# Patient Record
Sex: FEMALE | Race: WHITE | NOT HISPANIC OR LATINO | Employment: UNEMPLOYED | URBAN - METROPOLITAN AREA
[De-identification: names, ages, dates, MRNs, and addresses within clinical notes are randomized per-mention and may not be internally consistent; named-entity substitution may affect disease eponyms.]

---

## 2019-03-18 ENCOUNTER — OFFICE VISIT CONVERTED (OUTPATIENT)
Dept: NEUROSURGERY | Facility: CLINIC | Age: 49
End: 2019-03-18
Attending: PHYSICIAN ASSISTANT

## 2019-04-04 ENCOUNTER — OFFICE VISIT CONVERTED (OUTPATIENT)
Dept: NEUROSURGERY | Facility: CLINIC | Age: 49
End: 2019-04-04
Attending: PHYSICIAN ASSISTANT

## 2020-08-04 ENCOUNTER — OFFICE VISIT CONVERTED (OUTPATIENT)
Dept: NEUROSURGERY | Facility: CLINIC | Age: 50
End: 2020-08-04
Attending: PHYSICIAN ASSISTANT

## 2021-05-13 NOTE — PROGRESS NOTES
Progress Note      Patient Name: Chelsie Washington   Patient ID: [id number]   Sex: Female   YOB: 1970    Primary Care Provider: Wanda MORENO   Referring Provider: Shannan MORENO    Visit Date: August 4, 2020    Provider: Concetta Menjivar PA-C   Location: [facility]   Location Address: [address] [place] [address]  [id number]   Location Phone: [phone]          Chief Complaint     Patient is here for a follow up after injections.       History Of Present Illness     Pt notes that she got one LESI and that it helped with her symptoms. Pt notes that the injection helped with both right leg pain and low back pain. She would like to try additional LESIs.  She notes that she did not have insurance for a period and that she did not get additional injections.       Past Medical History  Arthritis; Degenerative Disc Disease ; Hyperlipidemia; Hypertension, Benign Essential; Lumbago/low back pain; Thyroid disease         Past Surgical History  Carpal Tunnel Release; Cholecystecomy; Hysterectomy; Joint Surgery         Medication List  atorvastatin 40 mg oral tablet; furosemide 20 mg oral tablet; Klor-Con 20 mEq oral packet; losartan 100 mg oral tablet; meloxicam 15 mg oral tablet; metoprolol tartrate 25 mg oral tablet         Allergy List  diclofenac sodium; PENICILLINS         Family Medical History  Family history of Arthritis; Family history of stroke; Family history of heart disease; Family history of osteoporosis         Social History  Alcohol (Current some day); Homemaker; lives with spouse; ; Tobacco (Never)         Review of Systems  · Constitutional  o Denies  o : chills, excessive sweating, fatigue, fever, sycope/passing out, weight gain, weight loss  · Eyes  o Denies  o : changes in vision, blurry vision, double vision  · HENT  o Denies  o : loss of hearing, ringing in the ears, ear aches, sore throat, nasal congestion, sinus pain, nose bleeds, seasonal  "allergies  · Cardiovascular  o Denies  o : blood clots, swollen legs, anemia, easy burising or bleeding, transfusions  · Respiratory  o Denies  o : shortness of breath, dry cough, productive cough, pneumonia, COPD  · Gastrointestinal  o Denies  o : difficulty swallowing, reflux  · Genitourinary  o Denies  o : incontinence  · Neurologic  o Denies  o : headache, seizure, stroke, tremor, loss of balance, falls, dizziness/vertigo, difficulty with sleep, numbness/tingling/paresthesia , difficulty with coordination, difficulty with dexterity, weakness  · Musculoskeletal  o Admits  o : neck stiffness/pain, sciatica, low back pain  o Denies  o : swollen lymph nodes, muscle aches, joint pain, weakness, spasms, pain radiating in arm, pain radiating in leg  · Endocrine  o Denies  o : diabetes, thyroid disorder  · Psychiatric  o Denies  o : anxiety, depression      Vitals  Date Time BP Position Site L\R Cuff Size HR RR TEMP (F) WT  HT  BMI kg/m2 BSA m2 O2 Sat        08/04/2020 03:42 PM        97.3 222lbs 0oz 5'  4\" 38.11 2.13           Physical Examination  · Constitutional  o Appearance  o : well-nourished, well developed, alert, in no acute distress  · Respiratory  o Respiratory Effort  o : breathing unlabored  · Cardiovascular  o Peripheral Vascular System  o :   § Extremities  § : no edema or cyanosis  · Musculoskeletal  o Spine  o :   § Inspection/Palpation  § : tenderness to palpation present in right lower back  o Right Lower Extremity  o :   § Inspection/Palpation  § : no joint or limb tenderness to palpation, no edema present, no ecchymosis  § Joint Stability  § : joint stability within normal limits  § Range of Motion  § : range of motion normal, no joint crepitations present, no pain on motion, Marcos's test negative  o Left Lower Extremity  o :   § Inspection/Palpation  § : no joint or limb tenderness to palpation, no edema present, no ecchymosis  § Joint Stability  § : joint stability within normal " limits  § Range of Motion  § : range of motion normal, no joint crepitations present, no pain on motion, Marcos's test negative  · Skin and Subcutaneous Tissue  o Extremities  o :   § Right Lower Extremity  § : no lesions or areas of discoloration  § Left Lower Extremity  § : no lesions or areas of discoloration  o Back  o : no lesions or areas of discoloration  · Neurologic  o Mental Status Examination  o :   § Orientation  § : alert and oriented to time, person, place and events  o Motor Examination  o :   § RLE Strength  § : mild foot drop noted on right.   § RLE Motor Function  § : tone normal, no atrophy, no abnormal movements noted  § LLE Strength  § : strength normal  § LLE Motor Function  § : tone normal, no atrophy, no abnormal movements noted  o Reflexes  o :   § RLE  § : knee and ankle reflexes 2/4, SLR negative  § LLE  § : knee and ankle reflexes 2/4, SLR negative  o Sensation  o :   § Light Touch  § : sensation intact to light touch in extremities  o Gait and Station  o :   § Gait Screening  § : normal gait, able to stand without difficulty  · Psychiatric  o Mood and Affect  o : mood normal, affect appropriate          Assessment  · Lumbago/low back pain     724.3/M54.40  · Paresthesia     782.0/R20.2  · Sciatica     724.3/M54.30    Problems Reconciled  Plan  · Medications  o Medications have been Reconciled  o Transition of Care or Provider Policy  · Instructions  o Will send for additional LESIs. Return to us as needed.   o Electronically Identified Patient Education Materials Provided Electronically  · Disposition  o Call or Return if symptoms worsen or persist.            Electronically Signed by: Concetta Menjivar PA-C -Author on August 4, 2020 04:28:47 PM

## 2021-05-15 VITALS — BODY MASS INDEX: 37.56 KG/M2 | WEIGHT: 220 LBS | HEART RATE: 77 BPM | HEIGHT: 64 IN

## 2021-05-15 VITALS — WEIGHT: 223 LBS | HEART RATE: 78 BPM | BODY MASS INDEX: 38.07 KG/M2 | HEIGHT: 64 IN

## 2021-05-15 VITALS — HEIGHT: 64 IN | BODY MASS INDEX: 37.9 KG/M2 | WEIGHT: 222 LBS | TEMPERATURE: 97.3 F

## 2021-06-02 ENCOUNTER — OFFICE VISIT CONVERTED (OUTPATIENT)
Dept: NEUROLOGY | Facility: CLINIC | Age: 51
End: 2021-06-02
Attending: NURSE PRACTITIONER

## 2021-06-05 NOTE — PROGRESS NOTES
Progress Note      Patient Name: Chelsie Washington   Patient ID: [id number]   Sex: Female   YOB: 1970    Primary Care Provider: Wanda MORENO   Referring Provider: Shannan MORENO    Visit Date: June 2, 2021    Provider: JOSH Allen   Location: [facility]   Location Address: [address] [place] [address]  [id number]   Location Phone: [phone]          Chief Complaint     Patient following up with chronic low back pain       History Of Present Illness     She was receiving LESI, initially was beneficial. Unfortunately did not receive any benefit with her most recent injection. She has concerns of weakness in the right thigh with frequent falls. She is having difficulty raising the leg, difficulty rising from a seated position. Unable to bend or squat. She is c/o cramping into the anterior thigh. No changes with bowel and bladder.    Her last MRI in 2019 shows disc protrusions at L1/2 and L4/5. L4/5 shows right sided canal and foraminal narrowing.       Past Medical History  Arthritis; Degenerative Disc Disease ; Hyperlipidemia; Hypertension, Benign Essential; Lumbago/low back pain; Thyroid disease         Past Surgical History  Carpal Tunnel Release; Cholecystecomy; Hysterectomy; Joint Surgery         Medication List  atorvastatin 40 mg oral tablet; furosemide 20 mg oral tablet; levothyroxine 112 mcg oral capsule; losartan 100 mg oral tablet; metformin 500 mg oral tablet; metoprolol tartrate 25 mg oral tablet; potassium citrate 15 mEq oral tablet extended release; vitamin B12-folic acid 500-400 mcg oral tablet         Allergy List  diclofenac sodium; PENICILLINS         Family Medical History  Family history of Arthritis; Family history of stroke; Family history of heart disease; Family history of osteoporosis         Social History  Alcohol (Current some day); Homemaker; lives with spouse; ; Tobacco (Never)         Review of  "Systems  · Constitutional  o Denies  o : chills, excessive sweating, fatigue, fever, sycope/passing out, weight gain, weight loss  · Eyes  o Denies  o : changes in vision, blurry vision, double vision  · HENT  o Denies  o : loss of hearing, ringing in the ears, ear aches, sore throat, nasal congestion, sinus pain, nose bleeds, seasonal allergies  · Cardiovascular  o Denies  o : blood clots, swollen legs, anemia, easy burising or bleeding, transfusions  · Respiratory  o Denies  o : shortness of breath, dry cough, productive cough, pneumonia, COPD  · Gastrointestinal  o Denies  o : difficulty swallowing, reflux  · Genitourinary  o Denies  o : incontinence  · Neurologic  o Denies  o : headache, seizure, stroke, tremor, loss of balance, falls, dizziness/vertigo, difficulty with sleep, numbness/tingling/paresthesia , difficulty with coordination, difficulty with dexterity, weakness  · Musculoskeletal  o Admits  o : muscle aches, weakness, spasms, pain radiating in leg, low back pain  o Denies  o : neck stiffness/pain, swollen lymph nodes, joint pain, sciatica, pain radiating in arm  · Endocrine  o Denies  o : diabetes, thyroid disorder  · Psychiatric  o Denies  o : anxiety, depression  · All Others Negative      Vitals  Date Time BP Position Site L\R Cuff Size HR RR TEMP (F) WT  HT  BMI kg/m2 BSA m2 O2 Sat FR L/min FiO2        06/02/2021 11:27 /81 Sitting    71 - R 99 97.2 225lbs 9oz 5'  3\" 39.96 2.13 99 %            Physical Examination  · Constitutional  o Appearance  o : well-nourished, well developed, alert, in no acute distress  · Respiratory  o Respiratory Effort  o : breathing unlabored  · Cardiovascular  o Peripheral Vascular System  o :   § Extremities  § : no edema or cyanosis  · Musculoskeletal  o Spine  o :   § Inspection/Palpation  § : tenderness to palpation present in right lower back  o Right Lower Extremity  o :   § Inspection/Palpation  § : TTP in the SI joint  § Joint Stability  § : joint " stability within normal limits  § Range of Motion  § : range of motion normal, no joint crepitations present, no pain on motion, Marcos's test negative  o Left Lower Extremity  o :   § Inspection/Palpation  § : no joint or limb tenderness to palpation, no edema present, no ecchymosis  § Joint Stability  § : joint stability within normal limits  § Range of Motion  § : range of motion normal, no joint crepitations present, no pain on motion, Marcos's test negative  · Skin and Subcutaneous Tissue  o Extremities  o :   § Right Lower Extremity  § : no lesions or areas of discoloration  § Left Lower Extremity  § : no lesions or areas of discoloration  o Back  o : no lesions or areas of discoloration  · Neurologic  o Mental Status Examination  o :   § Orientation  § : alert and oriented to time, person, place and events  o Motor Examination  o :   § RLE Strength  § : diffuse weakness in the leg  § RLE Motor Function  § : tone normal, no atrophy, no abnormal movements noted  § LLE Strength  § : strength normal  § LLE Motor Function  § : tone normal, no atrophy, no abnormal movements noted  o Reflexes  o :   § RLE  § : knee and ankle reflexes 2/4, back pain with SLR  § LLE  § : knee and ankle reflexes 2/4, SLR negative  o Sensation  o :   § Light Touch  § : reduced sensation in L5/S1 dermatome on the right  o Gait and Station  o :   § Gait Screening  § : difficulty with standing, antalgic broad based gait  · Psychiatric  o Mood and Affect  o : mood normal, affect appropriate              Assessment  · Displacement of lumbar intervertebral disc     722.10/M51.26  · Lumbago/low back pain     724.3/M54.40  · Paresthesia     782.0/R20.2  · Radiculopathy, lumbosacral     724.4/M54.16  · Sciatica     724.3/M54.30       Pt with chronic low back pain with disc protrusions at L1/2 and L4/5. She is no longer responding to LESI. Interested in surgical options. Will proceed with MRI Lumbar Spine and follow up to discuss surgery.        Plan  · Orders  o MRI lumbar spine w/o contrast (71501) - 782.0/R20.2, 722.10/M51.26, 724.4/M54.16 - 2021   Scheduled [place]   · Medications  o tizanidine 2 mg oral tablet   SI-2 tablets by mouth every 8 hours as need for muscle spasms   DISP: (60) Tablet with 1 refills  Prescribed on 2021     · Instructions  o MRI Lumbar Spine.  o Tizanidine 2-4 mg as needed.   o Follow up in 6 weeks.            Electronically Signed by: JSOH Allen -Author on 2021 12:06:33 PM

## 2021-06-19 ENCOUNTER — TRANSCRIBE ORDERS (OUTPATIENT)
Dept: ADMINISTRATIVE | Facility: HOSPITAL | Age: 51
End: 2021-06-19

## 2021-06-19 DIAGNOSIS — M54.17 RADICULOPATHY, LUMBOSACRAL REGION: ICD-10-CM

## 2021-06-19 DIAGNOSIS — M51.26 DISPLACEMENT OF LUMBAR INTERVERTEBRAL DISC: ICD-10-CM

## 2021-06-19 DIAGNOSIS — R20.2 PARESTHESIA: Primary | ICD-10-CM

## 2021-06-19 DIAGNOSIS — M51.26 RUPTURED LUMBAR INTERVERTEBRAL DISC: ICD-10-CM

## 2021-06-21 ENCOUNTER — TELEPHONE (OUTPATIENT)
Dept: NEUROSURGERY | Facility: CLINIC | Age: 51
End: 2021-06-21

## 2021-06-21 NOTE — TELEPHONE ENCOUNTER
Patient had originally been scheduled for an MRI at an outside facility and due to insurance she would like for the MRI referral to be for [facility]

## 2021-07-15 VITALS
BODY MASS INDEX: 39.96 KG/M2 | HEART RATE: 71 BPM | HEIGHT: 63 IN | DIASTOLIC BLOOD PRESSURE: 81 MMHG | SYSTOLIC BLOOD PRESSURE: 150 MMHG | OXYGEN SATURATION: 99 % | WEIGHT: 225.56 LBS | TEMPERATURE: 97.2 F

## 2021-07-28 ENCOUNTER — HOSPITAL ENCOUNTER (OUTPATIENT)
Dept: MRI IMAGING | Facility: HOSPITAL | Age: 51
Discharge: HOME | End: 2021-07-28
Admitting: NURSE PRACTITIONER

## 2021-07-28 DIAGNOSIS — R20.2 PARESTHESIA: ICD-10-CM

## 2021-07-28 DIAGNOSIS — M51.26 DISPLACEMENT OF LUMBAR INTERVERTEBRAL DISC: ICD-10-CM

## 2021-07-28 DIAGNOSIS — M54.17 RADICULOPATHY, LUMBOSACRAL REGION: ICD-10-CM

## 2021-07-28 PROCEDURE — 72148 MRI LUMBAR SPINE W/O DYE: CPT

## 2021-08-03 ENCOUNTER — OFFICE VISIT (OUTPATIENT)
Dept: NEUROSURGERY | Facility: CLINIC | Age: 51
End: 2021-08-03

## 2021-08-03 VITALS
HEART RATE: 63 BPM | HEIGHT: 63 IN | SYSTOLIC BLOOD PRESSURE: 159 MMHG | OXYGEN SATURATION: 98 % | DIASTOLIC BLOOD PRESSURE: 98 MMHG | BODY MASS INDEX: 39.9 KG/M2 | WEIGHT: 225.2 LBS

## 2021-08-03 DIAGNOSIS — M41.20 SCOLIOSIS (AND KYPHOSCOLIOSIS), IDIOPATHIC: ICD-10-CM

## 2021-08-03 DIAGNOSIS — M46.1 SI (SACROILIAC) JOINT INFLAMMATION (HCC): Primary | ICD-10-CM

## 2021-08-03 DIAGNOSIS — M47.817 SPONDYLOSIS OF LUMBOSACRAL REGION WITHOUT MYELOPATHY OR RADICULOPATHY: ICD-10-CM

## 2021-08-03 PROCEDURE — 99214 OFFICE O/P EST MOD 30 MIN: CPT | Performed by: NURSE PRACTITIONER

## 2021-08-03 RX ORDER — POTASSIUM CITRATE 15 MEQ/1
TABLET, EXTENDED RELEASE ORAL
COMMUNITY
Start: 2021-04-29

## 2021-08-03 RX ORDER — LOSARTAN POTASSIUM 100 MG/1
TABLET ORAL
COMMUNITY

## 2021-08-03 RX ORDER — FUROSEMIDE 20 MG/1
TABLET ORAL
COMMUNITY

## 2021-08-03 RX ORDER — TIZANIDINE 4 MG/1
4 TABLET ORAL NIGHTLY PRN
COMMUNITY

## 2021-08-03 RX ORDER — ATORVASTATIN CALCIUM 40 MG/1
TABLET, FILM COATED ORAL
COMMUNITY

## 2021-08-03 RX ORDER — LEVOTHYROXINE SODIUM 112 UG/1
CAPSULE ORAL
COMMUNITY

## 2021-08-03 NOTE — PROGRESS NOTES
"Chief Complaint  Back Pain and Follow-up (Lumbar MRI @ [facility])    Subjective          Chelsie Washington who is a 50 y.o. year old female who presents to [facility] her for follow up of her low back pain following MRI Lumbar Spine.    MRI lumbar spine shows dextrocurvature with multilevel degenerative changes with DDD, causing multilevel disc bulges and mild to moderate canal narrowing at several levels. No high grade canal or foraminal stenosis.     Her pain is primarily in the right lower back, radiating into the posterior thigh and stopping at the knee. She is also having similar pain on the left though not as significant. She has TTP at the SI joints bilaterally. Some TTP at trochanteric bursa though not severe.       Interval History 6/2/21  She was receiving LESI, initially was beneficial. Unfortunately did not receive any benefit with her most recent injection. She has concerns of weakness in the right thigh with frequent falls. She is having difficulty raising the leg, difficulty rising from a seated position. Unable to bend or squat. She is c/o cramping into the anterior thigh. No changes with bowel and bladder.    Her last MRI in 2019 shows disc protrusions at L1/2 and L4/5. L4/5 shows right sided canal and foraminal narrowing.       Recent Interventions: LESB ineffective      Review of Systems   Constitutional: Positive for fatigue.   Musculoskeletal: Positive for arthralgias, back pain, gait problem and myalgias.   Neurological: Positive for weakness.   All other systems reviewed and are negative.       Objective   Vital Signs:   /98   Pulse 63   Ht 160 cm (63\")   Wt 102 kg (225 lb 3.2 oz)   SpO2 98%   BMI 39.89 kg/m²       Physical Exam  Vitals reviewed.   Constitutional:       Appearance: Normal appearance.   Musculoskeletal:      Lumbar back: No tenderness. Negative right straight leg raise test and negative left straight leg raise test.      Right hip: " Tenderness (SI joint) present. Decreased range of motion.      Left hip: Tenderness (SI joint and greater trochanteric bursa) present. Decreased range of motion.   Neurological:      Mental Status: She is alert and oriented to person, place, and time.      Deep Tendon Reflexes: Strength normal.        Neurologic Exam     Mental Status   Oriented to person, place, and time.   Level of consciousness: alert    Motor Exam   Muscle bulk: normal  Overall muscle tone: normal    Strength   Strength 5/5 throughout.     Gait, Coordination, and Reflexes Antalgic gait        Result Review :       Data reviewed: Radiologic studies personally reviewed. MRI lumbar spine shows dextrocurvature with multilevel degenerative changes with DDD, causing multilevel disc bulges and mild to moderate canal narrowing at several levels. No high grade canal or foraminal stenosis.           Assessment and Plan    Diagnoses and all orders for this visit:    1. SI (sacroiliac) joint inflammation (CMS/HCC) (Primary)  -     Ambulatory Referral to Pain Management    2. Spondylosis of lumbosacral region without myelopathy or radiculopathy    3. Scoliosis (and kyphoscoliosis), idiopathic    Pt with primarily right sided low back pain with radiating pain into the posterior thigh stopping at the knee. We reviewed her MRI Lumbar Spine, does not provide a good explanation to her pain. No surgical recommendations at this time. She has notable TTP at the SI joints, worst on the right. Will proceed with referral to pain management for SI join injection on the right. She will follow up in 8 weeks to evaluate response.     I spent 30 minutes caring for Chelsie on this date of service. This time includes time spent by me in the following activities:preparing for the visit, reviewing tests, obtaining and/or reviewing a separately obtained history, performing a medically appropriate examination and/or evaluation , counseling and educating the  patient/family/caregiver, ordering medications, tests, or procedures, referring and communicating with other health care professionals , documenting information in the medical record and independently interpreting results and communicating that information with the patient/family/caregiver.    Follow Up   Return in about 8 weeks (around 9/28/2021).  Patient was given instructions and counseling regarding her condition or for health maintenance advice.     -Refer to pain management for right SI joint injection  -Follow up in 8 weeks